# Patient Record
Sex: MALE | Race: WHITE | NOT HISPANIC OR LATINO | URBAN - METROPOLITAN AREA
[De-identification: names, ages, dates, MRNs, and addresses within clinical notes are randomized per-mention and may not be internally consistent; named-entity substitution may affect disease eponyms.]

---

## 2019-10-15 ENCOUNTER — OUTPATIENT (OUTPATIENT)
Dept: OUTPATIENT SERVICES | Facility: HOSPITAL | Age: 79
LOS: 1 days | End: 2019-10-15
Payer: MEDICARE

## 2019-10-15 VITALS
RESPIRATION RATE: 19 BRPM | TEMPERATURE: 98 F | SYSTOLIC BLOOD PRESSURE: 137 MMHG | OXYGEN SATURATION: 97 % | DIASTOLIC BLOOD PRESSURE: 52 MMHG | WEIGHT: 205.91 LBS | HEART RATE: 60 BPM | HEIGHT: 69.5 IN

## 2019-10-15 VITALS
DIASTOLIC BLOOD PRESSURE: 69 MMHG | OXYGEN SATURATION: 97 % | RESPIRATION RATE: 19 BRPM | HEART RATE: 62 BPM | SYSTOLIC BLOOD PRESSURE: 139 MMHG

## 2019-10-15 DIAGNOSIS — Z90.49 ACQUIRED ABSENCE OF OTHER SPECIFIED PARTS OF DIGESTIVE TRACT: Chronic | ICD-10-CM

## 2019-10-15 DIAGNOSIS — Z94.5 SKIN TRANSPLANT STATUS: Chronic | ICD-10-CM

## 2019-10-15 DIAGNOSIS — Z98.890 OTHER SPECIFIED POSTPROCEDURAL STATES: Chronic | ICD-10-CM

## 2019-10-15 DIAGNOSIS — H25.21 AGE-RELATED CATARACT, MORGAGNIAN TYPE, RIGHT EYE: ICD-10-CM

## 2019-10-15 PROCEDURE — V2632: CPT

## 2019-10-15 PROCEDURE — 66984 XCAPSL CTRC RMVL W/O ECP: CPT | Mod: RT

## 2019-10-15 NOTE — ASU DISCHARGE PLAN (ADULT/PEDIATRIC) - CALL YOUR DOCTOR IF YOU HAVE ANY OF THE FOLLOWING:
Pain not relieved by Medications/Bleeding that does not stop/Swelling that gets worse/Fever greater than (need to indicate Fahrenheit or Celsius)/Nausea and vomiting that does not stop

## 2019-10-15 NOTE — ASU PREOP CHECKLIST - 1.
Patient is [place] wears b/l hearing aids, Right one given to spouse , left hearting aid left in place

## 2019-10-15 NOTE — ASU PATIENT PROFILE, ADULT - PMH
CAD (coronary artery disease)    Carotid stenosis    Dyslipidemia    ETOH abuse    HTN (hypertension)

## 2019-10-15 NOTE — ASU PATIENT PROFILE, ADULT - PSH
H/O carotid endarterectomy  right side  S/P appy    S/P annalisa H/O carotid endarterectomy  right side  H/O skin graft  Left thigh donor site right thigh  S/P appy    S/P annalisa

## 2019-10-15 NOTE — ASU PATIENT PROFILE, ADULT - ABILITY TO HEAR (WITH HEARING AID OR HEARING APPLIANCE IF NORMALLY USED):
Wears b/l hearing aids/Mildly to Moderately Impaired: difficulty hearing in some environments or speaker may need to increase volume or speak distinctly

## 2019-10-15 NOTE — ASU PATIENT PROFILE, ADULT - VISION (WITH CORRECTIVE LENSES IF THE PATIENT USUALLY WEARS THEM):
Partially impaired: cannot see medication labels or newsprint, but can see obstacles in path, and the surrounding layout; can count fingers at arm's length/Left eye with blurriness

## 2019-10-15 NOTE — ASU DISCHARGE PLAN (ADULT/PEDIATRIC) - PATIENT EDUCATION MATERIALS PROVIED
Provider pre-printed instructions given/Implant card (specify)/Other (specify)/Intraocular lens implant (IOL) Eye shield with instructions , sunglasses and eye kit given to patient.

## 2022-11-30 ENCOUNTER — OFFICE (OUTPATIENT)
Dept: URBAN - METROPOLITAN AREA CLINIC 90 | Facility: CLINIC | Age: 82
Setting detail: OPHTHALMOLOGY
End: 2022-11-30
Payer: MEDICARE

## 2022-11-30 DIAGNOSIS — H35.373: ICD-10-CM

## 2022-11-30 DIAGNOSIS — E78.01: ICD-10-CM

## 2022-11-30 DIAGNOSIS — H02.831: ICD-10-CM

## 2022-11-30 DIAGNOSIS — H02.834: ICD-10-CM

## 2022-11-30 DIAGNOSIS — H53.002: ICD-10-CM

## 2022-11-30 DIAGNOSIS — H40.013: ICD-10-CM

## 2022-11-30 DIAGNOSIS — H16.223: ICD-10-CM

## 2022-11-30 PROBLEM — D31.32 CHOROIDAL NEVUS, LEFT EYE: Status: ACTIVE | Noted: 2022-11-30

## 2022-11-30 PROCEDURE — 92133 CPTRZD OPH DX IMG PST SGM ON: CPT | Performed by: OPHTHALMOLOGY

## 2022-11-30 PROCEDURE — 92014 COMPRE OPH EXAM EST PT 1/>: CPT | Performed by: OPHTHALMOLOGY

## 2022-11-30 ASSESSMENT — REFRACTION_MANIFEST
OS_CYLINDER: -0.50
OD_AXIS: 140
OS_ADD: +2.75
OS_VA2: 20/30
OD_CYLINDER: -1.00
OS_SPHERE: -6.00
OD_VA2: 20/30
OD_SPHERE: -2.75
OD_VA1: 20/50+-
OD_ADD: +3.00
OD_SPHERE: -0.25
OD_ADD: +2.75
OS_ADD: +3.00
OD_CYLINDER: -1.25
OS_SPHERE: -0.50
OD_SPHERE: -3.00
OD_ADD: +3.00
OS_VA1: 20/70+
OS_SPHERE: -0.50
OD_AXIS: 145
OD_CYLINDER: -1.25
OS_CYLINDER: -0.25
OD_VA1: 20/25-1
OS_VA1: 20/60
OD_AXIS: 140
OS_AXIS: 15
OS_ADD: +3.00
OS_AXIS: 138
OS_AXIS: 15
OS_CYLINDER: -0.25

## 2022-11-30 ASSESSMENT — REFRACTION_CURRENTRX
OS_OVR_VA: 20/
OD_SPHERE: +2.50
OD_CYLINDER: -1.00
OD_SPHERE: -2.75
OS_ADD: +3.00
OS_VPRISM_DIRECTION: PROGS
OD_CYLINDER: SPH
OD_VPRISM_DIRECTION: SV
OD_OVR_VA: 20/
OS_SPHERE: +2.50
OD_OVR_VA: 20/
OS_OVR_VA: 20/
OS_CYLINDER: -0.25
OS_AXIS: 015
OD_ADD: +3.00
OS_CYLINDER: SPH
OS_VPRISM_DIRECTION: SV
OD_VPRISM_DIRECTION: PROGS
OS_OVR_VA: 20/
OS_AXIS: 142
OD_VPRISM_DIRECTION: PROGS
OS_SPHERE: -0.50
OS_SPHERE: -0.50
OD_CYLINDER: -1.25
OD_SPHERE: -0.50
OS_CYLINDER: -1.00
OS_VPRISM_DIRECTION: PROGS
OD_AXIS: 133
OD_ADD: +3.00
OD_OVR_VA: 20/
OS_ADD: +3.00
OD_AXIS: 139

## 2022-11-30 ASSESSMENT — CORNEAL DYSTROPHY
OD_DYSTROPHY: T ENDO PIGMENT.  ? LIMBAL GIRDLE.
OS_DYSTROPHY: T ENDO PIGMENT.  ? LIMBAL GIRDLE.

## 2022-11-30 ASSESSMENT — SUPERFICIAL PUNCTATE KERATITIS (SPK)
OS_SPK: 1+
OD_SPK: 1+

## 2022-11-30 ASSESSMENT — SPHEQUIV_DERIVED
OD_SPHEQUIV: 0.375
OS_SPHEQUIV: -0.625
OD_SPHEQUIV: -3.625
OD_SPHEQUIV: -0.75
OS_SPHEQUIV: 0.25
OS_SPHEQUIV: -0.625
OS_SPHEQUIV: -6.25
OD_SPHEQUIV: -3.375

## 2022-11-30 ASSESSMENT — PACHYMETRY
OD_CT_CORRECTION: -1
OS_CT_CORRECTION: -2
OD_CT_UM: 562
OS_CT_UM: 577

## 2022-11-30 ASSESSMENT — REFRACTION_AUTOREFRACTION
OD_CYLINDER: -1.25
OS_SPHERE: +0.50
OD_SPHERE: +1.00
OS_AXIS: 109
OD_AXIS: 139
OS_CYLINDER: -0.50

## 2022-11-30 ASSESSMENT — LID EXAM ASSESSMENTS
OS_COMMENTS: LONG LASHES
OD_COMMENTS: LONG LASHES

## 2022-11-30 ASSESSMENT — TONOMETRY
OD_IOP_MMHG: 14
OS_IOP_MMHG: 14

## 2022-11-30 ASSESSMENT — LID POSITION - DERMATOCHALASIS
OS_DERMATOCHALASIS: LUL 1+
OD_DERMATOCHALASIS: RUL 1+

## 2022-11-30 ASSESSMENT — TEAR BREAK UP TIME (TBUT)
OS_TBUT: T
OD_TBUT: T

## 2022-11-30 ASSESSMENT — CONFRONTATIONAL VISUAL FIELD TEST (CVF)
OD_FINDINGS: FULL
OS_FINDINGS: FULL

## 2022-11-30 ASSESSMENT — VISUAL ACUITY
OS_BCVA: 20/30-2
OD_BCVA: 20/70

## 2023-07-03 ENCOUNTER — OFFICE (OUTPATIENT)
Dept: URBAN - METROPOLITAN AREA CLINIC 90 | Facility: CLINIC | Age: 83
Setting detail: OPHTHALMOLOGY
End: 2023-07-03
Payer: MEDICARE

## 2023-07-03 DIAGNOSIS — H02.834: ICD-10-CM

## 2023-07-03 DIAGNOSIS — H02.831: ICD-10-CM

## 2023-07-03 DIAGNOSIS — H35.373: ICD-10-CM

## 2023-07-03 DIAGNOSIS — H16.223: ICD-10-CM

## 2023-07-03 DIAGNOSIS — E78.01: ICD-10-CM

## 2023-07-03 DIAGNOSIS — H40.013: ICD-10-CM

## 2023-07-03 DIAGNOSIS — D31.32: ICD-10-CM

## 2023-07-03 DIAGNOSIS — H43.391: ICD-10-CM

## 2023-07-03 DIAGNOSIS — H53.002: ICD-10-CM

## 2023-07-03 PROCEDURE — 92014 COMPRE OPH EXAM EST PT 1/>: CPT | Performed by: OPHTHALMOLOGY

## 2023-07-03 PROCEDURE — 92250 FUNDUS PHOTOGRAPHY W/I&R: CPT | Performed by: OPHTHALMOLOGY

## 2023-07-03 PROCEDURE — 92083 EXTENDED VISUAL FIELD XM: CPT | Performed by: OPHTHALMOLOGY

## 2023-07-03 ASSESSMENT — CORNEAL DYSTROPHY
OD_DYSTROPHY: T ENDO PIGMENT.  ? LIMBAL GIRDLE.
OS_DYSTROPHY: T ENDO PIGMENT.  ? LIMBAL GIRDLE.

## 2023-07-03 ASSESSMENT — REFRACTION_CURRENTRX
OS_CYLINDER: SPH
OS_VPRISM_DIRECTION: SV
OD_AXIS: 139
OD_CYLINDER: -1.25
OD_VPRISM_DIRECTION: PROGS
OS_OVR_VA: 20/
OD_OVR_VA: 20/
OS_SPHERE: -0.50
OD_VPRISM_DIRECTION: SV
OD_CYLINDER: -1.00
OD_ADD: +3.00
OD_ADD: +3.00
OD_SPHERE: -2.75
OS_AXIS: 137
OD_AXIS: 136
OS_SPHERE: +2.50
OD_SPHERE: +2.50
OD_OVR_VA: 20/
OS_OVR_VA: 20/
OS_SPHERE: -0.50
OS_AXIS: 015
OD_SPHERE: -0.50
OD_OVR_VA: 20/
OS_ADD: +3.00
OD_CYLINDER: SPH
OS_OVR_VA: 20/
OS_CYLINDER: -0.25
OD_VPRISM_DIRECTION: PROGS
OS_VPRISM_DIRECTION: PROGS
OS_ADD: +3.00
OS_VPRISM_DIRECTION: PROGS
OS_CYLINDER: -1.00

## 2023-07-03 ASSESSMENT — TONOMETRY
OD_IOP_MMHG: 14
OS_IOP_MMHG: 15

## 2023-07-03 ASSESSMENT — KERATOMETRY
OD_K2POWER_DIOPTERS: 42.50
OS_K2POWER_DIOPTERS: 42.00
OD_K1POWER_DIOPTERS: 41.25
OS_AXISANGLE_DEGREES: 058
OD_AXISANGLE_DEGREES: 066
METHOD_AUTO_MANUAL: AUTO
OS_K1POWER_DIOPTERS: 41.75

## 2023-07-03 ASSESSMENT — PACHYMETRY
OS_CT_UM: 577
OD_CT_CORRECTION: -1
OS_CT_CORRECTION: -2
OD_CT_UM: 562

## 2023-07-03 ASSESSMENT — VISUAL ACUITY
OD_BCVA: 20/70-1
OS_BCVA: 20/40-1

## 2023-07-03 ASSESSMENT — REFRACTION_MANIFEST
OS_ADD: +3.00
OD_CYLINDER: -1.00
OD_ADD: +2.75
OS_SPHERE: -6.00
OS_CYLINDER: -0.25
OS_ADD: +3.00
OD_CYLINDER: -1.25
OS_SPHERE: -0.50
OS_VA1: 20/60
OS_CYLINDER: -0.50
OD_CYLINDER: -1.25
OD_ADD: +3.00
OD_AXIS: 145
OD_VA2: 20/30
OS_AXIS: 15
OD_VA1: 20/25-1
OD_SPHERE: -3.00
OD_AXIS: 140
OD_AXIS: 140
OS_VA1: 20/70+
OS_CYLINDER: -0.25
OD_SPHERE: -0.25
OS_AXIS: 15
OS_VA2: 20/30
OS_SPHERE: -0.50
OD_ADD: +3.00
OD_SPHERE: -2.75
OD_VA1: 20/50+-
OS_ADD: +2.75
OS_AXIS: 138

## 2023-07-03 ASSESSMENT — SPHEQUIV_DERIVED
OD_SPHEQUIV: -0.75
OS_SPHEQUIV: -0.625
OS_SPHEQUIV: -0.625
OD_SPHEQUIV: 0.375
OD_SPHEQUIV: -3.375
OD_SPHEQUIV: -3.625
OS_SPHEQUIV: 0.375
OS_SPHEQUIV: -6.25

## 2023-07-03 ASSESSMENT — CONFRONTATIONAL VISUAL FIELD TEST (CVF)
OD_FINDINGS: FULL
OS_FINDINGS: FULL

## 2023-07-03 ASSESSMENT — AXIALLENGTH_DERIVED
OD_AL: 25.6723
OS_AL: 27.0709
OD_AL: 24.0515
OD_AL: 24.5158
OD_AL: 25.7881
OS_AL: 24.4633
OS_AL: 24.4633
OS_AL: 24.0515

## 2023-07-03 ASSESSMENT — REFRACTION_AUTOREFRACTION
OS_CYLINDER: -0.75
OD_CYLINDER: -1.25
OS_SPHERE: +0.75
OD_AXIS: 136
OS_AXIS: 107
OD_SPHERE: +1.00

## 2023-07-03 ASSESSMENT — LID EXAM ASSESSMENTS
OS_COMMENTS: LONG LASHES
OD_COMMENTS: LONG LASHES

## 2023-07-03 ASSESSMENT — LID POSITION - DERMATOCHALASIS
OD_DERMATOCHALASIS: RUL 1+
OS_DERMATOCHALASIS: LUL 1+

## 2024-01-08 ENCOUNTER — OFFICE (OUTPATIENT)
Dept: URBAN - METROPOLITAN AREA CLINIC 90 | Facility: CLINIC | Age: 84
Setting detail: OPHTHALMOLOGY
End: 2024-01-08
Payer: MEDICARE

## 2024-01-08 DIAGNOSIS — H16.223: ICD-10-CM

## 2024-01-08 DIAGNOSIS — H53.002: ICD-10-CM

## 2024-01-08 DIAGNOSIS — H02.831: ICD-10-CM

## 2024-01-08 DIAGNOSIS — H40.013: ICD-10-CM

## 2024-01-08 DIAGNOSIS — E78.01: ICD-10-CM

## 2024-01-08 DIAGNOSIS — H02.834: ICD-10-CM

## 2024-01-08 DIAGNOSIS — H43.391: ICD-10-CM

## 2024-01-08 PROCEDURE — 92083 EXTENDED VISUAL FIELD XM: CPT | Performed by: OPHTHALMOLOGY

## 2024-01-08 PROCEDURE — 92012 INTRM OPH EXAM EST PATIENT: CPT | Performed by: OPHTHALMOLOGY

## 2024-01-08 PROCEDURE — 92133 CPTRZD OPH DX IMG PST SGM ON: CPT | Performed by: OPHTHALMOLOGY

## 2024-01-08 ASSESSMENT — REFRACTION_CURRENTRX
OD_SPHERE: -0.50
OS_AXIS: 132
OS_SPHERE: +2.50
OD_SPHERE: -0.50
OS_OVR_VA: 20/
OS_OVR_VA: 20/
OS_CYLINDER: SPH
OS_AXIS: 015
OS_SPHERE: -0.50
OD_CYLINDER: -1.25
OD_SPHERE: -2.75
OD_VPRISM_DIRECTION: PROGS
OD_SPHERE: +2.50
OD_OVR_VA: 20/
OD_OVR_VA: 20/
OS_VPRISM_DIRECTION: PROGS
OD_ADD: +3.00
OS_ADD: +2.50
OD_ADD: +3.00
OD_CYLINDER: -1.00
OD_VPRISM_DIRECTION: PROGS
OD_CYLINDER: SPH
OD_AXIS: 136
OS_VPRISM_DIRECTION: SV
OD_ADD: +2.50
OD_CYLINDER: -1.00
OS_OVR_VA: 20/
OD_AXIS: 134
OS_SPHERE: -0.50
OS_CYLINDER: -1.25
OS_SPHERE: -0.50
OS_CYLINDER: -0.25
OD_OVR_VA: 20/
OS_VPRISM_DIRECTION: PROGS
OD_AXIS: 139
OS_AXIS: 137
OS_ADD: +3.00
OD_VPRISM_DIRECTION: PROGS
OS_ADD: +3.00
OS_CYLINDER: -1.00
OD_VPRISM_DIRECTION: SV
OS_VPRISM_DIRECTION: PROGS

## 2024-01-08 ASSESSMENT — LID EXAM ASSESSMENTS
OS_COMMENTS: LONG LASHES
OD_COMMENTS: LONG LASHES

## 2024-01-08 ASSESSMENT — REFRACTION_MANIFEST
OD_VA1: 20/25-1
OD_SPHERE: -0.25
OS_VA1: 20/60
OS_CYLINDER: -0.25
OD_CYLINDER: -1.25
OS_AXIS: 15
OD_AXIS: 145
OD_CYLINDER: -1.00
OS_SPHERE: -0.50
OD_SPHERE: -2.75
OS_CYLINDER: -0.25
OD_AXIS: 140
OS_VA2: 20/30
OS_ADD: +3.00
OS_SPHERE: -0.50
OS_CYLINDER: -0.50
OD_VA1: 20/50+-
OD_AXIS: 140
OD_ADD: +3.00
OD_ADD: +3.00
OD_ADD: +2.75
OS_ADD: +2.75
OD_SPHERE: -3.00
OS_AXIS: 138
OS_AXIS: 15
OS_VA1: 20/70+
OS_ADD: +3.00
OS_SPHERE: -6.00
OD_CYLINDER: -1.25
OD_VA2: 20/30

## 2024-01-08 ASSESSMENT — LID POSITION - DERMATOCHALASIS
OD_DERMATOCHALASIS: RUL 1+
OS_DERMATOCHALASIS: LUL 1+

## 2024-01-08 ASSESSMENT — SPHEQUIV_DERIVED
OS_SPHEQUIV: -6.25
OS_SPHEQUIV: 0.125
OD_SPHEQUIV: -3.375
OD_SPHEQUIV: -0.75
OS_SPHEQUIV: -0.625
OS_SPHEQUIV: -0.625
OD_SPHEQUIV: -3.625

## 2024-01-08 ASSESSMENT — REFRACTION_AUTOREFRACTION
OS_SPHERE: +0.50
OS_AXIS: 090
OD_AXIS: 124
OS_CYLINDER: -0.75

## 2024-01-08 ASSESSMENT — SUPERFICIAL PUNCTATE KERATITIS (SPK)
OS_SPK: 1+
OD_SPK: T

## 2024-01-08 ASSESSMENT — CONFRONTATIONAL VISUAL FIELD TEST (CVF)
OS_FINDINGS: FULL
OD_FINDINGS: FULL

## 2024-01-08 ASSESSMENT — CORNEAL DYSTROPHY
OD_DYSTROPHY: T ENDO PIGMENT.  ? LIMBAL GIRDLE.
OS_DYSTROPHY: T ENDO PIGMENT.  ? LIMBAL GIRDLE.

## 2024-07-15 ENCOUNTER — OFFICE (OUTPATIENT)
Dept: URBAN - METROPOLITAN AREA CLINIC 90 | Facility: CLINIC | Age: 84
Setting detail: OPHTHALMOLOGY
End: 2024-07-15
Payer: MEDICARE

## 2024-07-15 DIAGNOSIS — H16.223: ICD-10-CM

## 2024-07-15 DIAGNOSIS — H40.013: ICD-10-CM

## 2024-07-15 DIAGNOSIS — H53.002: ICD-10-CM

## 2024-07-15 DIAGNOSIS — H26.40: ICD-10-CM

## 2024-07-15 DIAGNOSIS — H35.373: ICD-10-CM

## 2024-07-15 DIAGNOSIS — D31.32: ICD-10-CM

## 2024-07-15 DIAGNOSIS — E78.01: ICD-10-CM

## 2024-07-15 DIAGNOSIS — H43.391: ICD-10-CM

## 2024-07-15 PROCEDURE — 92250 FUNDUS PHOTOGRAPHY W/I&R: CPT | Performed by: OPHTHALMOLOGY

## 2024-07-15 PROCEDURE — 92014 COMPRE OPH EXAM EST PT 1/>: CPT | Performed by: OPHTHALMOLOGY

## 2024-07-15 ASSESSMENT — LID POSITION - DERMATOCHALASIS
OD_DERMATOCHALASIS: RUL 1+
OS_DERMATOCHALASIS: LUL 1+

## 2024-07-15 ASSESSMENT — LID EXAM ASSESSMENTS
OS_COMMENTS: LONG LASHES
OD_COMMENTS: LONG LASHES

## 2024-07-15 ASSESSMENT — CONFRONTATIONAL VISUAL FIELD TEST (CVF)
OS_FINDINGS: FULL
OD_FINDINGS: FULL

## 2025-01-23 ENCOUNTER — OFFICE (OUTPATIENT)
Facility: LOCATION | Age: 85
Setting detail: OPHTHALMOLOGY
End: 2025-01-23
Payer: MEDICARE

## 2025-01-23 DIAGNOSIS — H26.493: ICD-10-CM

## 2025-01-23 DIAGNOSIS — D31.32: ICD-10-CM

## 2025-01-23 DIAGNOSIS — H53.002: ICD-10-CM

## 2025-01-23 DIAGNOSIS — H43.391: ICD-10-CM

## 2025-01-23 DIAGNOSIS — H35.373: ICD-10-CM

## 2025-01-23 DIAGNOSIS — H40.013: ICD-10-CM

## 2025-01-23 DIAGNOSIS — E78.01: ICD-10-CM

## 2025-01-23 DIAGNOSIS — H16.223: ICD-10-CM

## 2025-01-23 PROCEDURE — 92134 CPTRZ OPH DX IMG PST SGM RTA: CPT | Performed by: OPHTHALMOLOGY

## 2025-01-23 PROCEDURE — 92014 COMPRE OPH EXAM EST PT 1/>: CPT | Performed by: OPHTHALMOLOGY

## 2025-01-23 ASSESSMENT — REFRACTION_CURRENTRX
OD_SPHERE: -2.75
OS_ADD: +3.00
OS_OVR_VA: 20/
OS_VPRISM_DIRECTION: PROGS
OD_ADD: +3.00
OS_SPHERE: PLANO
OS_SPHERE: -0.50
OD_VPRISM_DIRECTION: PROGS
OD_CYLINDER: -1.00
OS_VPRISM_DIRECTION: PROGS
OS_ADD: +2.50
OS_AXIS: 015
OD_ADD: +2.75
OS_CYLINDER: -0.25
OS_SPHERE: -0.50
OD_SPHERE: +2.50
OD_AXIS: 136
OD_CYLINDER: SPH
OS_CYLINDER: -1.00
OD_SPHERE: -0.50
OS_VPRISM_DIRECTION: PROGS
OS_ADD: +3.00
OS_AXIS: 134
OS_AXIS: 132
OS_OVR_VA: 20/
OS_CYLINDER: +1.25
OD_VPRISM_DIRECTION: SV
OS_SPHERE: -0.50
OD_ADD: +3.00
OD_SPHERE: -0.50
OD_CYLINDER: -1.25
OD_CYLINDER: -1.00
OD_AXIS: 137
OD_OVR_VA: 20/
OD_OVR_VA: 20/
OS_AXIS: 137
OS_ADD: +2.75
OS_VPRISM_DIRECTION: PROGS
OS_SPHERE: +2.50
OD_AXIS: 134
OS_CYLINDER: -1.25
OD_VPRISM_DIRECTION: PROGS
OD_VPRISM_DIRECTION: PROGS
OD_AXIS: 139
OD_VPRISM_DIRECTION: PROGS
OS_OVR_VA: 20/
OS_VPRISM_DIRECTION: SV
OS_CYLINDER: SPH
OD_SPHERE: -0.50
OD_CYLINDER: -1.25
OD_ADD: +2.50
OD_OVR_VA: 20/

## 2025-01-23 ASSESSMENT — REFRACTION_MANIFEST
OD_VA2: 20/30
OD_SPHERE: -3.00
OS_SPHERE: -0.50
OD_VA1: 20/25-1
OS_ADD: +3.00
OS_AXIS: 15
OS_SPHERE: -0.50
OD_ADD: +3.00
OS_AXIS: 15
OD_SPHERE: -0.25
OS_VA1: 20/60
OD_ADD: +3.00
OD_SPHERE: -2.75
OD_ADD: +2.75
OS_ADD: +3.00
OD_AXIS: 140
OD_AXIS: 145
OS_VA2: 20/30
OS_CYLINDER: -0.50
OS_ADD: +2.75
OS_CYLINDER: -0.25
OD_CYLINDER: -1.00
OS_AXIS: 138
OD_CYLINDER: -1.25
OS_CYLINDER: -0.25
OD_AXIS: 140
OD_CYLINDER: -1.25
OS_SPHERE: -6.00
OS_VA1: 20/70+
OD_VA1: 20/50+-

## 2025-01-23 ASSESSMENT — PACHYMETRY
OD_CT_UM: 562
OS_CT_UM: 577
OS_CT_CORRECTION: -2
OD_CT_CORRECTION: -1

## 2025-01-23 ASSESSMENT — REFRACTION_AUTOREFRACTION
OS_CYLINDER: -0.50
OD_SPHERE: +0.50
OD_AXIS: 123
OS_SPHERE: +0.25
OD_CYLINDER: -1.00
OS_AXIS: 085

## 2025-01-23 ASSESSMENT — KERATOMETRY
METHOD_AUTO_MANUAL: AUTO
OS_AXISANGLE_DEGREES: 080
OS_K1POWER_DIOPTERS: 42.00
OD_K2POWER_DIOPTERS: 42.50
OS_K2POWER_DIOPTERS: 42.25
OD_AXISANGLE_DEGREES: 056
OD_K1POWER_DIOPTERS: 41.25

## 2025-01-23 ASSESSMENT — VISUAL ACUITY
OS_BCVA: 20/30+2
OD_BCVA: 20/70+2

## 2025-01-23 ASSESSMENT — SUPERFICIAL PUNCTATE KERATITIS (SPK)
OS_SPK: 1+
OD_SPK: T

## 2025-01-23 ASSESSMENT — LID EXAM ASSESSMENTS
OS_COMMENTS: LONG LASHES
OD_COMMENTS: LONG LASHES

## 2025-01-23 ASSESSMENT — LID POSITION - DERMATOCHALASIS
OS_DERMATOCHALASIS: LUL 1+
OD_DERMATOCHALASIS: RUL 1+

## 2025-01-23 ASSESSMENT — CONFRONTATIONAL VISUAL FIELD TEST (CVF)
OS_FINDINGS: FULL
OD_FINDINGS: FULL

## 2025-01-23 ASSESSMENT — CORNEAL DYSTROPHY
OS_DYSTROPHY: T ENDO PIGMENT.  ? LIMBAL GIRDLE.
OD_DYSTROPHY: T ENDO PIGMENT.  ? LIMBAL GIRDLE.

## 2025-07-10 ENCOUNTER — OFFICE (OUTPATIENT)
Facility: LOCATION | Age: 85
Setting detail: OPHTHALMOLOGY
End: 2025-07-10
Payer: MEDICARE

## 2025-07-10 DIAGNOSIS — H26.493: ICD-10-CM

## 2025-07-10 DIAGNOSIS — H16.223: ICD-10-CM

## 2025-07-10 DIAGNOSIS — H53.002: ICD-10-CM

## 2025-07-10 DIAGNOSIS — H35.373: ICD-10-CM

## 2025-07-10 DIAGNOSIS — H43.391: ICD-10-CM

## 2025-07-10 DIAGNOSIS — H40.013: ICD-10-CM

## 2025-07-10 DIAGNOSIS — E78.01: ICD-10-CM

## 2025-07-10 DIAGNOSIS — D31.32: ICD-10-CM

## 2025-07-10 PROCEDURE — 92133 CPTRZD OPH DX IMG PST SGM ON: CPT | Performed by: OPHTHALMOLOGY

## 2025-07-10 PROCEDURE — 99213 OFFICE O/P EST LOW 20 MIN: CPT | Performed by: OPHTHALMOLOGY

## 2025-07-10 ASSESSMENT — CONFRONTATIONAL VISUAL FIELD TEST (CVF)
OD_FINDINGS: FULL
OS_FINDINGS: FULL

## 2025-07-10 ASSESSMENT — LID POSITION - DERMATOCHALASIS
OS_DERMATOCHALASIS: LUL 1+
OD_DERMATOCHALASIS: RUL 1+

## 2025-07-10 ASSESSMENT — LID EXAM ASSESSMENTS
OD_COMMENTS: LONG LASHES
OS_COMMENTS: LONG LASHES

## 2025-07-10 ASSESSMENT — CORNEAL DYSTROPHY
OD_DYSTROPHY: T ENDO PIGMENT.  ? LIMBAL GIRDLE.
OS_DYSTROPHY: T ENDO PIGMENT.  ? LIMBAL GIRDLE.

## 2025-07-10 ASSESSMENT — PACHYMETRY
OS_CT_CORRECTION: -2
OD_CT_UM: 562
OD_CT_CORRECTION: -1
OS_CT_UM: 577

## 2025-07-10 ASSESSMENT — SUPERFICIAL PUNCTATE KERATITIS (SPK)
OS_SPK: 1+
OD_SPK: T

## 2025-07-25 ASSESSMENT — REFRACTION_MANIFEST
OD_SPHERE: -3.00
OD_AXIS: 145
OS_CYLINDER: -0.25
OD_SPHERE: -0.25
OD_CYLINDER: -1.25
OS_ADD: +2.75
OS_AXIS: 138
OS_ADD: +3.00
OD_VA2: 20/30
OS_CYLINDER: -0.25
OS_SPHERE: -6.00
OS_SPHERE: -0.50
OD_VA1: 20/25-1
OS_VA2: 20/30
OS_AXIS: 15
OS_VA1: 20/70+
OS_VA1: 20/60
OD_ADD: +3.00
OD_ADD: +2.75
OD_AXIS: 140
OD_SPHERE: -2.75
OS_CYLINDER: -0.50
OS_AXIS: 15
OD_VA1: 20/50+-
OS_SPHERE: -0.50
OD_CYLINDER: -1.25
OD_ADD: +3.00
OD_AXIS: 140
OD_CYLINDER: -1.00
OS_ADD: +3.00

## 2025-07-25 ASSESSMENT — REFRACTION_AUTOREFRACTION
OD_AXIS: 131
OS_SPHERE: +0.50
OD_CYLINDER: -1.25
OS_AXIS: 088
OD_SPHERE: +0.50
OS_CYLINDER: -0.75

## 2025-07-25 ASSESSMENT — REFRACTION_CURRENTRX
OD_OVR_VA: 20/
OD_ADD: +3.00
OS_AXIS: 015
OD_OVR_VA: 20/
OS_ADD: +3.00
OS_AXIS: 117
OS_VPRISM_DIRECTION: PROGS
OS_AXIS: 132
OD_AXIS: 136
OS_SPHERE: PLANO
OD_AXIS: 138
OD_ADD: +2.50
OS_OVR_VA: 20/
OD_SPHERE: -0.50
OS_CYLINDER: -1.00
OS_SPHERE: +2.50
OD_CYLINDER: -1.00
OS_SPHERE: -0.50
OS_SPHERE: -0.50
OS_ADD: +3.00
OD_CYLINDER: SPH
OD_VPRISM_DIRECTION: PROGS
OS_VPRISM_DIRECTION: PROGS
OS_ADD: +2.50
OD_SPHERE: -0.50
OD_AXIS: 139
OS_VPRISM_DIRECTION: SV
OD_VPRISM_DIRECTION: PROGS
OS_OVR_VA: 20/
OD_OVR_VA: 20/
OD_SPHERE: -0.50
OS_OVR_VA: 20/
OS_VPRISM_DIRECTION: PROGS
OD_AXIS: 134
OS_CYLINDER: -0.25
OD_AXIS: 137
OD_VPRISM_DIRECTION: PROGS
OS_CYLINDER: -0.75
OD_CYLINDER: -1.00
OS_ADD: +2.75
OS_CYLINDER: SPH
OD_VPRISM_DIRECTION: SV
OD_SPHERE: +2.50
OS_CYLINDER: -1.25
OS_SPHERE: +1.75
OD_CYLINDER: -1.00
OS_AXIS: 137
OD_ADD: +3.00
OS_SPHERE: -0.50
OS_VPRISM_DIRECTION: PROGS
OS_AXIS: 134
OD_CYLINDER: -1.25
OD_ADD: +2.75
OS_CYLINDER: +1.25
OD_VPRISM_DIRECTION: PROGS
OD_SPHERE: -0.50
OD_CYLINDER: -1.25
OD_SPHERE: -2.75

## 2025-07-25 ASSESSMENT — KERATOMETRY
OD_AXISANGLE_DEGREES: 052
OS_K1POWER_DIOPTERS: 41.75
OS_K2POWER_DIOPTERS: 42.25
OS_AXISANGLE_DEGREES: 012
OD_K2POWER_DIOPTERS: 42.50
OD_K1POWER_DIOPTERS: 41.50
METHOD_AUTO_MANUAL: AUTO

## 2025-07-25 ASSESSMENT — VISUAL ACUITY
OS_BCVA: 20/50-1
OD_BCVA: 20/60